# Patient Record
Sex: FEMALE | URBAN - METROPOLITAN AREA
[De-identification: names, ages, dates, MRNs, and addresses within clinical notes are randomized per-mention and may not be internally consistent; named-entity substitution may affect disease eponyms.]

---

## 2018-06-07 ENCOUNTER — APPOINTMENT (OUTPATIENT)
Age: 38
Setting detail: DERMATOLOGY
End: 2018-06-07

## 2018-06-07 DIAGNOSIS — L21.8 OTHER SEBORRHEIC DERMATITIS: ICD-10-CM

## 2018-06-07 PROCEDURE — 99202 OFFICE O/P NEW SF 15 MIN: CPT

## 2018-06-07 PROCEDURE — OTHER ADDITIONAL NOTES: OTHER

## 2018-06-07 PROCEDURE — OTHER PRESCRIPTION: OTHER

## 2018-06-07 PROCEDURE — OTHER EDUCATIONAL RESOURCES PROVIDED: OTHER

## 2018-06-07 PROCEDURE — OTHER PRESCRIPTION MEDICATION MANAGEMENT: OTHER

## 2018-06-07 PROCEDURE — OTHER COUNSELING: OTHER

## 2018-06-07 RX ORDER — CICLOPIROX 1 %
SHAMPOO TOPICAL QD
Qty: 120 | Refills: 12 | Status: ERX | COMMUNITY
Start: 2018-06-07

## 2018-06-07 ASSESSMENT — LOCATION DETAILED DESCRIPTION DERM: LOCATION DETAILED: LEFT SUPERIOR PARIETAL SCALP

## 2018-06-07 ASSESSMENT — LOCATION ZONE DERM: LOCATION ZONE: SCALP

## 2018-06-07 ASSESSMENT — LOCATION SIMPLE DESCRIPTION DERM: LOCATION SIMPLE: SCALP

## 2018-06-07 NOTE — PROCEDURE: ADDITIONAL NOTES
Additional Notes: Pt aware she can call office to have Ketoconazole shampoo sent to pharmacy if itching intensifies during the summer months. Pt will also intake Zyrtec if itching recurs as well.

## 2018-06-07 NOTE — HPI: ITCHING
How Did Your Itching Occur?: sudden in onset (over a period of weeks to a few months)
How Severe Is Your Itching?: moderate
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 Post lice treatment pt still noticed itching and was advise to d/c dying hair. Pt reports the itching has decreased now but would like to prevent recurrence this summer. Pt states during the points of itching there was no evidence of lice or dandruff.

## 2019-03-07 ENCOUNTER — APPOINTMENT (OUTPATIENT)
Age: 39
Setting detail: DERMATOLOGY
End: 2019-03-07

## 2019-03-07 DIAGNOSIS — L21.8 OTHER SEBORRHEIC DERMATITIS: ICD-10-CM

## 2019-03-07 PROCEDURE — OTHER PRESCRIPTION: OTHER

## 2019-03-07 PROCEDURE — OTHER COUNSELING: OTHER

## 2019-03-07 PROCEDURE — 99213 OFFICE O/P EST LOW 20 MIN: CPT

## 2019-03-07 PROCEDURE — OTHER ADDITIONAL NOTES: OTHER

## 2019-03-07 PROCEDURE — OTHER PRESCRIPTION MEDICATION MANAGEMENT: OTHER

## 2019-03-07 RX ORDER — CLOBETASOL PROPIONATE 0.5 MG/ML
SOLUTION TOPICAL BID
Qty: 50 | Refills: 2 | Status: ERX | COMMUNITY
Start: 2019-03-07

## 2019-03-07 ASSESSMENT — LOCATION SIMPLE DESCRIPTION DERM: LOCATION SIMPLE: SCALP

## 2019-03-07 ASSESSMENT — LOCATION ZONE DERM: LOCATION ZONE: SCALP

## 2019-03-07 ASSESSMENT — LOCATION DETAILED DESCRIPTION DERM: LOCATION DETAILED: LEFT SUPERIOR PARIETAL SCALP

## 2019-03-07 NOTE — PROCEDURE: PRESCRIPTION MEDICATION MANAGEMENT
Initiate Treatment: Clobetasol 0.05% Solution BID
Plan: Continue ciclopirox shampoo. Apply Clobetasol Solution to itchy spots on scalp twice daily for 2 weeks as needed for flaring.
Otc Regimen: Zyrtec QAM
Continue Regimen: Ciclopirox shampoo
Detail Level: Zone

## 2019-03-07 NOTE — PROCEDURE: ADDITIONAL NOTES
Additional Notes: Recommend patient to try taking a antihistamine to help reduce itching.
Detail Level: Simple

## 2019-12-18 RX ORDER — CICLOPIROX 1 %
SHAMPOO TOPICAL QD
Qty: 120 | Refills: 2 | Status: ERX